# Patient Record
Sex: MALE | Race: BLACK OR AFRICAN AMERICAN | NOT HISPANIC OR LATINO | URBAN - METROPOLITAN AREA
[De-identification: names, ages, dates, MRNs, and addresses within clinical notes are randomized per-mention and may not be internally consistent; named-entity substitution may affect disease eponyms.]

---

## 2017-01-03 ENCOUNTER — EMERGENCY (EMERGENCY)
Facility: HOSPITAL | Age: 42
LOS: 1 days | Discharge: HOME | End: 2017-01-03
Attending: EMERGENCY MEDICINE | Admitting: EMERGENCY MEDICINE
Payer: MEDICAID

## 2017-01-03 ENCOUNTER — EMERGENCY (EMERGENCY)
Facility: HOSPITAL | Age: 42
LOS: 0 days | Discharge: LEFT BEFORE DISCHARGE | End: 2017-01-03

## 2017-01-03 VITALS
DIASTOLIC BLOOD PRESSURE: 111 MMHG | HEART RATE: 94 BPM | OXYGEN SATURATION: 97 % | RESPIRATION RATE: 20 BRPM | SYSTOLIC BLOOD PRESSURE: 152 MMHG | TEMPERATURE: 98 F

## 2017-01-03 VITALS
HEART RATE: 99 BPM | RESPIRATION RATE: 18 BRPM | SYSTOLIC BLOOD PRESSURE: 153 MMHG | OXYGEN SATURATION: 100 % | TEMPERATURE: 98 F | DIASTOLIC BLOOD PRESSURE: 97 MMHG

## 2017-01-03 VITALS
WEIGHT: 240.08 LBS | DIASTOLIC BLOOD PRESSURE: 104 MMHG | RESPIRATION RATE: 18 BRPM | HEART RATE: 105 BPM | SYSTOLIC BLOOD PRESSURE: 151 MMHG | HEIGHT: 66 IN | TEMPERATURE: 98 F | OXYGEN SATURATION: 98 %

## 2017-01-03 DIAGNOSIS — I10 ESSENTIAL (PRIMARY) HYPERTENSION: ICD-10-CM

## 2017-01-03 DIAGNOSIS — R09.81 NASAL CONGESTION: ICD-10-CM

## 2017-01-03 PROCEDURE — 99282 EMERGENCY DEPT VISIT SF MDM: CPT

## 2017-01-03 NOTE — ED PROVIDER NOTE - OBJECTIVE STATEMENT
40 yo M w/ PMH of HTN p/w nasal congestion. Pt states that he began feeling congested about 2 days ago. Since that time, he has tried multiple OTC meds with no help. He relates that he had a fever of 99 last night and took tylenol. No other episodes of fever. No chills, nausea, or vomiting. No other complaints at this time. 40 yo M w/ PMH of HTN p/w nasal congestion. Pt states that he began feeling congested about 2 days ago. Since that time, he has tried multiple OTC meds with no help. He relates that he had a sujbective fever and took temperature and was 99F last night and took tylenol. No other episodes of fever. No chills, nausea, or vomiting. No other complaints at this time. Patient family member in ER being evaulated for the same. No cough. patient took bp meds. no cp, no headache.

## 2017-01-03 NOTE — ED PROVIDER NOTE - ENMT, MLM
Airway not patent, Nasal mucosa clear. Mouth with normal mucosa. Throat has no vesicles, no oropharyngeal exudates and uvula is midline.

## 2017-01-03 NOTE — ED ADULT NURSE NOTE - OBJECTIVE STATEMENT
pt 41 yr old male came in today for upper respiratory symptoms x 2 days. pt has cough, nasal congestion. productive clear sputum cough. low grade fever reported at home 99.0 denies any, chills, abd pain, chest pain, sob, nausea/vomiting, recent travel. will continue to monitor pt

## 2017-01-03 NOTE — ED PROVIDER NOTE - MEDICAL DECISION MAKING DETAILS
Emmie: Patient with complaint of congestion. Patient has history of hypertension, can't take sudafed. has tried tylenol at home. no sob, no cough, no rash, no travel. will give normal saline humidifed. will d/c home, recommend nasal saline, humidifier and flonase at home.

## 2017-01-03 NOTE — ED PROVIDER NOTE - ATTENDING CONTRIBUTION TO CARE
I performed a history and physical exam of the patient and discussed their management with the resident. I reviewed the resident's note and agree with the documented findings and plan of care. My medical decision making and observations are found above.

## 2017-01-03 NOTE — ED PROVIDER NOTE - CARE PLAN
Principal Discharge DX:	Nasal congestion  Instructions for follow-up, activity and diet:	Saline nasal irrigation performed. Principal Discharge DX:	Nasal congestion  Instructions for follow-up, activity and diet:	1. Follow up with primary care doctor within 3-5 days of discharge  2. Use saline irrigation daily as needed  3. Return to ED if symptoms worsen

## 2017-01-03 NOTE — ED PROVIDER NOTE - PLAN OF CARE
Saline nasal irrigation performed. 1. Follow up with primary care doctor within 3-5 days of discharge  2. Use saline irrigation daily as needed  3. Return to ED if symptoms worsen